# Patient Record
Sex: FEMALE | Race: ASIAN | NOT HISPANIC OR LATINO
[De-identification: names, ages, dates, MRNs, and addresses within clinical notes are randomized per-mention and may not be internally consistent; named-entity substitution may affect disease eponyms.]

---

## 2023-07-11 ENCOUNTER — APPOINTMENT (OUTPATIENT)
Dept: UROLOGY | Facility: CLINIC | Age: 23
End: 2023-07-11

## 2023-07-11 PROBLEM — Z00.00 ENCOUNTER FOR PREVENTIVE HEALTH EXAMINATION: Status: ACTIVE | Noted: 2023-07-11

## 2023-12-27 ENCOUNTER — APPOINTMENT (OUTPATIENT)
Dept: UROLOGY | Facility: CLINIC | Age: 23
End: 2023-12-27
Payer: MEDICAID

## 2023-12-27 VITALS
SYSTOLIC BLOOD PRESSURE: 123 MMHG | BODY MASS INDEX: 17.45 KG/M2 | DIASTOLIC BLOOD PRESSURE: 83 MMHG | RESPIRATION RATE: 18 BRPM | HEART RATE: 88 BPM | TEMPERATURE: 97.9 F | OXYGEN SATURATION: 95 % | WEIGHT: 109.9 LBS | HEIGHT: 66.54 IN

## 2023-12-27 DIAGNOSIS — Z78.9 OTHER SPECIFIED HEALTH STATUS: ICD-10-CM

## 2023-12-27 PROCEDURE — 99205 OFFICE O/P NEW HI 60 MIN: CPT | Mod: 25

## 2023-12-27 PROCEDURE — 51798 US URINE CAPACITY MEASURE: CPT

## 2023-12-27 RX ORDER — SULFAMETHOXAZOLE AND TRIMETHOPRIM 800; 160 MG/1; MG/1
800-160 TABLET ORAL
Refills: 0 | Status: ACTIVE | COMMUNITY

## 2023-12-27 RX ORDER — IBUPROFEN 600 MG/1
600 TABLET, FILM COATED ORAL
Refills: 0 | Status: ACTIVE | COMMUNITY

## 2023-12-27 RX ORDER — METRONIDAZOLE 500 MG/1
500 TABLET ORAL
Refills: 0 | Status: ACTIVE | COMMUNITY

## 2023-12-27 RX ORDER — DOXYCYCLINE HYCLATE 100 MG/1
100 CAPSULE ORAL
Refills: 0 | Status: ACTIVE | COMMUNITY

## 2023-12-27 RX ORDER — CEPHALEXIN 500 MG/1
500 CAPSULE ORAL
Refills: 0 | Status: ACTIVE | COMMUNITY

## 2023-12-27 RX ORDER — ACETAMINOPHEN 500 MG/1
500 TABLET ORAL
Refills: 0 | Status: ACTIVE | COMMUNITY

## 2023-12-27 RX ORDER — PHENAZOPYRIDINE HYDROCHLORIDE 200 MG/1
200 TABLET ORAL
Refills: 0 | Status: ACTIVE | COMMUNITY

## 2023-12-27 RX ORDER — OXYBUTYNIN CHLORIDE 10 MG/1
10 TABLET, EXTENDED RELEASE ORAL
Refills: 0 | Status: ACTIVE | COMMUNITY

## 2023-12-27 NOTE — ASSESSMENT
[FreeTextEntry1] : 23 year old female here for evaluation of recurrent UTIs. Suspect component of pelvic floor dysfunction vs. Ketamine cystitis. Discussed ketamine cessation due to risks to bladder and may be contributing to symptoms. Will have her undergo further evaluation with cystoscopy. Also has a component of vaginal pain and discussed referral to my colleague who has clinic in [place], however, she would like to hold off for now. She was instructed to use vaginal gel for the pain  Plan:  - Stop ketamine usage - Start Flomax  - Ellura cranberry supplements - Vaginal probiotics and gel - Cystoscopy

## 2023-12-27 NOTE — PHYSICAL EXAM
[General Appearance - Well Developed] : well developed [General Appearance - Well Nourished] : well nourished [Normal Appearance] : normal appearance [] : no respiratory distress [Exaggerated Use Of Accessory Muscles For Inspiration] : no accessory muscle use [Normal Station and Gait] : the gait and station were normal for the patient's age [Skin Color & Pigmentation] : normal skin color and pigmentation [Abdomen Soft] : soft [Abdomen Tenderness] : non-tender [Abdomen Mass (___ Cm)] : no abdominal mass palpated [Urethral Meatus] : normal urethra [de-identified] : Tenderness throughout vaginal surfaces. Levator ani at the 5 and 7 oclock position tenderness but not tight. Pain at the labia minora.   Roughness at the posterior urethra.

## 2023-12-27 NOTE — HISTORY OF PRESENT ILLNESS
[FreeTextEntry1] : Ms. Gonsales is a 23 year old female self referred for recurrent UTIs. States she has been getting monthly UTIs for the past few months.  Urine cultures: January 2023 SEE, labs on phone: July culture staph haemolyticus.  November culture > 3 organisms.  Usual UTI symptoms include at times severe pelvic pain, frequency every 5-10 mins, dysuria, urgency.  She also has weak stream, urinates only drops, strains to urinate and has incomplete bladder emptying. Pain improves a little after urinating. Also endorses gross hematuria.   States abx only alleviate symptoms some but does not completely resolve.  She currently has symptoms and is taking Azo for pain.  She was seen in an urgent care and was given medications for UTI and STD. STD testing reportedly negative.  She has yellow vaginal discharge and has vaginal dryness/soreness. Has dyspareunia.   No constipation.  Vapes, no alcohol, reports she has used Ketamine for several months  Drinks 1 cup coffee daily 1L water  PVR today after voiding 5 cc: 0cc

## 2023-12-27 NOTE — END OF VISIT
[FreeTextEntry3] : Her symptoms of pelvic and vaginal pain, dyspareunia seem more consistent with vulvodynia or topical irritation Her exam is less consistent with PFD - will not consider vaginal valium nor PFT at this juncture  VOiding symptoms are ketamine cystitis related until proven otherwise will recommend cystoscopy will trial tamsulosin 0.4 mg qhs - counseled re elaine calderon and that this will NOT address the irreversible causes of ketamine use  spent sig time counseling pt on cessation of ketamine use she will elt us know when we can refer her to help if she needs to for cessation warned her of the sequela of continued use, including irreparatble bladder damage  provided topical gels for the vaginal area, irritation   will consider next steps if no improvement  rto for cystoscopy, consider oab therapies if no major signs of baldder injury  The total time spent with the patient includes face to face time as well as time for documentation, ordering medications/labs/procedures, and care coordination, but I acknowledge it does not include time spent on any procedures performed (eg PVR, UDS, Cystoscopy, catheter changes, etc).  Time includes reviewing the chart prior to visit, documentation, and correspondence. [Time Spent: ___ minutes] : I have spent [unfilled] minutes of time on the encounter.

## 2024-01-24 ENCOUNTER — APPOINTMENT (OUTPATIENT)
Dept: UROLOGY | Facility: CLINIC | Age: 24
End: 2024-01-24
Payer: MEDICAID

## 2024-01-24 ENCOUNTER — APPOINTMENT (OUTPATIENT)
Dept: UROLOGY | Facility: CLINIC | Age: 24
End: 2024-01-24

## 2024-01-24 VITALS
OXYGEN SATURATION: 99 % | RESPIRATION RATE: 18 BRPM | WEIGHT: 110 LBS | HEIGHT: 66 IN | DIASTOLIC BLOOD PRESSURE: 70 MMHG | SYSTOLIC BLOOD PRESSURE: 103 MMHG | HEART RATE: 101 BPM | BODY MASS INDEX: 17.68 KG/M2

## 2024-01-24 DIAGNOSIS — N94.10 UNSPECIFIED DYSPAREUNIA: ICD-10-CM

## 2024-01-24 PROCEDURE — 52000 CYSTOURETHROSCOPY: CPT

## 2024-02-28 ENCOUNTER — APPOINTMENT (OUTPATIENT)
Dept: UROLOGY | Facility: CLINIC | Age: 24
End: 2024-02-28

## 2024-03-13 ENCOUNTER — APPOINTMENT (OUTPATIENT)
Dept: UROLOGY | Facility: CLINIC | Age: 24
End: 2024-03-13

## 2024-04-12 ENCOUNTER — APPOINTMENT (OUTPATIENT)
Dept: UROLOGY | Facility: CLINIC | Age: 24
End: 2024-04-12

## 2024-04-19 ENCOUNTER — APPOINTMENT (OUTPATIENT)
Dept: UROLOGY | Facility: CLINIC | Age: 24
End: 2024-04-19
Payer: MEDICAID

## 2024-04-19 DIAGNOSIS — N39.0 URINARY TRACT INFECTION, SITE NOT SPECIFIED: ICD-10-CM

## 2024-04-19 DIAGNOSIS — R10.2 PELVIC AND PERINEAL PAIN: ICD-10-CM

## 2024-04-19 DIAGNOSIS — N30.80 OTHER CYSTITIS W/OUT HEMATURIA: ICD-10-CM

## 2024-04-19 DIAGNOSIS — N39.8 OTHER SPECIFIED DISORDERS OF URINARY SYSTEM: ICD-10-CM

## 2024-04-19 DIAGNOSIS — T41.295A OTHER CYSTITIS W/OUT HEMATURIA: ICD-10-CM

## 2024-04-19 PROCEDURE — 99214 OFFICE O/P EST MOD 30 MIN: CPT

## 2024-04-19 RX ORDER — TAMSULOSIN HYDROCHLORIDE 0.4 MG/1
0.4 CAPSULE ORAL
Qty: 90 | Refills: 3 | Status: ACTIVE | COMMUNITY
Start: 2023-12-27 | End: 1900-01-01

## 2024-05-07 ENCOUNTER — OUTPATIENT (OUTPATIENT)
Dept: OUTPATIENT SERVICES | Facility: HOSPITAL | Age: 24
LOS: 1 days | Discharge: ANOTHER HOSPITAL | End: 2024-05-07
Payer: MEDICAID

## 2024-05-07 PROCEDURE — 90839 PSYTX CRISIS INITIAL 60 MIN: CPT

## 2024-05-23 DIAGNOSIS — F43.20 ADJUSTMENT DISORDER, UNSPECIFIED: ICD-10-CM

## 2024-08-05 ENCOUNTER — APPOINTMENT (OUTPATIENT)
Dept: UROLOGY | Facility: CLINIC | Age: 24
End: 2024-08-05

## 2024-08-05 PROCEDURE — G2211 COMPLEX E/M VISIT ADD ON: CPT | Mod: NC,1L

## 2024-08-05 PROCEDURE — 99205 OFFICE O/P NEW HI 60 MIN: CPT

## 2024-08-05 NOTE — ASSESSMENT
[FreeTextEntry1] :  Ms. 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 Niño reviewed  Patient understand the long-term effects of ketamine use on the urinary tract including bladder pain, LUTS, reduced compliance leading to an end-stage bladder. Despite this, she is not seeking help to stop her usage. Offered to assist on this front, but patient did not provide affirmative she would like help.   Discussed the risks, benefits, alternatives, and possible side effects of anticholinergic therapy with the patient, including but not limited to dry eyes, dry mouth, constipation and mental status changes. We reviewed literature suggesting increased risk of developing dementia with long-term use and that patient would need to be regularly monitored on the medication.  Recommendations -1st line bladder pain: warm baths, heating pads, stretching, aloe vera -start oxybutynin ER 10 mg qd -Patient will need to show progress to stopping ketamine use before considering further diagnostics, botox, or more advanced therapy.

## 2024-08-05 NOTE — PHYSICAL EXAM
[Well Groomed] : well groomed [] : no respiratory distress [FreeTextEntry1] : Chaperone: BERNABE Avalos

## 2024-08-05 NOTE — HISTORY OF PRESENT ILLNESS
[Urinary Frequency] : urinary frequency [Urinary Urgency] : urinary urgency [Nocturia] : nocturia [Straining] : straining [FreeTextEntry1] : 23F presents for initial evaluation of bladder pain self-referred    PMH significant for: Randee, dysfunctional voiding   PSH significant for: ****  Significant meds: tamsulosin, oxybutynin     Seen by Dr. 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